# Patient Record
Sex: MALE | Race: WHITE | URBAN - METROPOLITAN AREA
[De-identification: names, ages, dates, MRNs, and addresses within clinical notes are randomized per-mention and may not be internally consistent; named-entity substitution may affect disease eponyms.]

---

## 2023-06-29 ENCOUNTER — OFFICE VISIT (OUTPATIENT)
Dept: URBAN - METROPOLITAN AREA CLINIC 75 | Facility: CLINIC | Age: 58
End: 2023-06-29
Payer: COMMERCIAL

## 2023-06-29 DIAGNOSIS — H52.13 MYOPIA, BILATERAL: ICD-10-CM

## 2023-06-29 DIAGNOSIS — H04.123 DRY EYE SYNDROME OF BILATERAL LACRIMAL GLANDS: ICD-10-CM

## 2023-06-29 DIAGNOSIS — H10.45 OTHER CHRONIC ALLERGIC CONJUNCTIVITIS: ICD-10-CM

## 2023-06-29 DIAGNOSIS — H25.13 AGE-RELATED NUCLEAR CATARACT, BILATERAL: Primary | ICD-10-CM

## 2023-06-29 PROCEDURE — 99204 OFFICE O/P NEW MOD 45 MIN: CPT | Performed by: OPTOMETRIST

## 2023-06-29 ASSESSMENT — KERATOMETRY
OS: 41.50
OD: 41.88

## 2023-06-29 ASSESSMENT — VISUAL ACUITY
OS: 20/20
OD: 20/20

## 2023-06-29 ASSESSMENT — INTRAOCULAR PRESSURE
OD: 18
OS: 17

## 2023-06-29 NOTE — IMPRESSION/PLAN
Impression: Other chronic allergic conjunctivitis: H10.45. Plan: Discussed & Ed pt that OTC Allergy gtts would work better than RX pt currently has. [facility] to [facility] Scottie Suzie or similar for pt.

## 2023-06-29 NOTE — IMPRESSION/PLAN
Impression: Dry eye syndrome of bilateral lacrimal glands: H04.123. Plan: Dry eye syndrome requires lubrication of the eye with artificial tears and nighttime ointments or gels. In addition, topical and oral anti-inflammatory medications are usually necessary to treat the associated ocular irritation. Recommend increasing 3-4 drops daily of OTC Lubrication such as Systane Balance or Refresh before symptoms begin and Gel ointment at bedtime. Recommended setting timer for daily alarm to assist with consistency. [facility] to dispense gtts or similar. Contact office if dry eye does not improve, worsens or blurs vision.